# Patient Record
Sex: MALE
[De-identification: names, ages, dates, MRNs, and addresses within clinical notes are randomized per-mention and may not be internally consistent; named-entity substitution may affect disease eponyms.]

---

## 2019-08-28 ENCOUNTER — HOSPITAL ENCOUNTER (EMERGENCY)
Dept: HOSPITAL 5 - ED | Age: 29
LOS: 1 days | Discharge: HOME | End: 2019-08-29
Payer: SELF-PAY

## 2019-08-28 VITALS — SYSTOLIC BLOOD PRESSURE: 155 MMHG | DIASTOLIC BLOOD PRESSURE: 107 MMHG

## 2019-08-28 DIAGNOSIS — S43.005A: Primary | ICD-10-CM
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
## 2019-08-28 PROCEDURE — 73030 X-RAY EXAM OF SHOULDER: CPT

## 2019-08-28 PROCEDURE — 73020 X-RAY EXAM OF SHOULDER: CPT

## 2019-08-28 PROCEDURE — 23650 CLTX SHO DSLC W/MNPJ WO ANES: CPT

## 2019-08-28 PROCEDURE — 96375 TX/PRO/DX INJ NEW DRUG ADDON: CPT

## 2019-08-28 PROCEDURE — 96374 THER/PROPH/DIAG INJ IV PUSH: CPT

## 2019-08-28 NOTE — XRAY REPORT
LEFT SHOULDER 3 VIEWS.



INDICATION / CLINICAL INFORMATION:

MAIN: left shoulder pain dislocation denies  injury



COMPARISON:

None available.

 

FINDINGS:



BONES / JOINT(S): Left subcortical or dislocation. No significant arthritis.

SOFT TISSUES: No significant abnormality.



ADDITIONAL FINDINGS: None.







Signer Name: Curly Solis MD 

Signed: 8/28/2019 11:22 PM

 Workstation Name: [id number]

## 2019-08-29 NOTE — EMERGENCY DEPARTMENT REPORT
ED Extremity Problem HPI





- General


Chief complaint: Extremity Injury, Upper


Stated complaint: LEFT SHOULDER PAIN


Time Seen by Provider: 08/28/19 23:25


Source: patient


Mode of arrival: Ambulatory


Limitations: No Limitations





- History of Present Illness


Initial comments: 





Patient is a 29-year-old  male who suffered a shoulder dislocation while

playing soccer earlier today.  Patient tried to reduce the shoulder himself with

a friend but was unsuccessful.  Patient's had 2 previous shoulder dislocations 

in the past.  Patient states pain is a 10 out of 10 which is worse with movement

better with rest.  His aching throbbing pain to left shoulder.  Patient denies 

any other injury at this time.


Severity scale (0 -10): 8





- Related Data


                                  Previous Rx's











 Medication  Instructions  Recorded  Last Taken  Type


 


Ibuprofen [Motrin 800 MG tab] 800 mg PO Q8HR PRN #10 tablet 08/29/19 Unknown Rx


 


methOCARBAMOL [Robaxin TAB] 500 mg PO Q6H PRN #14 tablet 08/29/19 Unknown Rx


 


traMADol [Ultram] 50 mg PO Q6HR PRN #12 tablet 08/29/19 Unknown Rx











                                    Allergies











Allergy/AdvReac Type Severity Reaction Status Date / Time


 


No Known Allergies Allergy   Unverified 08/28/19 21:32














ED Review of Systems


ROS: 


Stated complaint: LEFT SHOULDER PAIN


Other details as noted in HPI





Comment: All other systems reviewed and negative





ED Past Medical Hx





- Past Medical History


Previous Medical History?: No





- Surgical History


Past Surgical History?: No





- Social History


Smoking Status: Never Smoker


Substance Use Type: None





- Medications


Home Medications: 


                                Home Medications











 Medication  Instructions  Recorded  Confirmed  Last Taken  Type


 


Ibuprofen [Motrin 800 MG tab] 800 mg PO Q8HR PRN #10 tablet 08/29/19  Unknown Rx


 


methOCARBAMOL [Robaxin TAB] 500 mg PO Q6H PRN #14 tablet 08/29/19  Unknown Rx


 


traMADol [Ultram] 50 mg PO Q6HR PRN #12 tablet 08/29/19  Unknown Rx














ED Physical Exam





- General


Limitations: Language Barrier


General appearance: alert, in no apparent distress





- Head


Head exam: Present: atraumatic, normocephalic





- Eye


Eye exam: Present: normal appearance





- ENT


ENT exam: Present: mucous membranes moist





- Neck


Neck exam: Present: normal inspection





- Respiratory


Respiratory exam: Absent: respiratory distress





- GI/Abdominal


GI/Abdominal exam: Present: soft.  Absent: distended





- Rectal


Rectal exam: Present: deferred





- Extremities Exam


Extremities exam: Present: normal inspection





- Expanded Upper Extremity Exam


  ** Left


Shoulder Exam: Present: tenderness, deformity (deltoid).  Absent: full ROM, 

swelling





- Back Exam


Back exam: Present: normal inspection





- Neurological Exam


Neurological exam: Present: alert, oriented X3





- Psychiatric


Psychiatric exam: Present: normal affect, normal mood





- Skin


Skin exam: Present: warm, dry, intact, normal color.  Absent: rash





ED Course





                                   Vital Signs











  08/28/19 08/28/19 08/28/19





  22:21 23:30 23:40


 


Temperature 98.1 F  98.5 F


 


Pulse Rate 94 H  80


 


Respiratory 16 16 18





Rate   


 


Blood Pressure 161/82  


 


Blood Pressure   155/107





[Right]   


 


O2 Sat by Pulse 98  96





Oximetry   














  08/28/19 08/29/19 08/29/19





  23:45 00:00 00:15


 


Temperature   


 


Pulse Rate  68 


 


Respiratory 16 12 16





Rate   


 


Blood Pressure  155/107 


 


Blood Pressure   





[Right]   


 


O2 Sat by Pulse  97 





Oximetry   














- Orthopedic Joint Reduction


  ** Joint #1


Consent Obtained: verbal consent


Time Out Performed: Yes


Side: left


Joint Reduction Location: shoulder


Analgesia: other (10ml of lidocaine placed within joint )


Shoulder Technique Used (if applicable): [facility]


Post Reduction X-Ray Obtained: Yes


Post Reduction X-Ray Results: reduced


Splint Applied: Yes


Patient Tolerated Procedure: well





ED Medical Decision Making





- Radiology Data





X-ray of the left shoulder shows a anterior dislocation.


Critical care attestation.: 


If time is entered above; I have spent that time in minutes in the direct care 

of this critically ill patient, excluding procedure time.








ED Disposition


Clinical Impression: 


Shoulder dislocation


Qualifiers:


 Encounter type: initial encounter Laterality: left Qualified Code(s): S43.005A 

- Unspecified dislocation of left shoulder joint, initial encounter





Disposition: DC-01 TO HOME OR SELFCARE


Is pt being admited?: No


Does the pt Need Aspirin: No


Condition: Stable


Instructions:  Shoulder Dislocation (ED)


Referrals: 


AVILA OCAMPO MD [Staff Physician] - 3-5 Days


Time of Disposition: 00:45


Print Language: Estonian

## 2019-08-29 NOTE — XRAY REPORT
LEFT SHOULDER ONE VIEW.



INDICATION / CLINICAL INFORMATION:

post reduction film



COMPARISON:

None available.

 

FINDINGS:



BONES / JOINT(S): Satisfactory reduction at the glenohumeral joint. No bony injury identified. No sig
nificant arthritis.

SOFT TISSUES: No significant abnormality.



ADDITIONAL FINDINGS: None.







Signer Name: Curly Solis MD 

Signed: 8/29/2019 12:52 AM

 Workstation Name: [id number]